# Patient Record
Sex: FEMALE | Race: WHITE | NOT HISPANIC OR LATINO | Employment: FULL TIME
[De-identification: names, ages, dates, MRNs, and addresses within clinical notes are randomized per-mention and may not be internally consistent; named-entity substitution may affect disease eponyms.]

---

## 2017-11-21 ENCOUNTER — RECORDS - HEALTHEAST (OUTPATIENT)
Dept: ADMINISTRATIVE | Facility: OTHER | Age: 47
End: 2017-11-21

## 2017-11-24 ENCOUNTER — RECORDS - HEALTHEAST (OUTPATIENT)
Dept: ADMINISTRATIVE | Facility: OTHER | Age: 47
End: 2017-11-24

## 2017-12-05 ENCOUNTER — RECORDS - HEALTHEAST (OUTPATIENT)
Dept: ADMINISTRATIVE | Facility: OTHER | Age: 47
End: 2017-12-05

## 2017-12-06 ENCOUNTER — RECORDS - HEALTHEAST (OUTPATIENT)
Dept: ADMINISTRATIVE | Facility: OTHER | Age: 47
End: 2017-12-06

## 2017-12-07 ENCOUNTER — RECORDS - HEALTHEAST (OUTPATIENT)
Dept: ADMINISTRATIVE | Facility: OTHER | Age: 47
End: 2017-12-07

## 2017-12-12 ENCOUNTER — RECORDS - HEALTHEAST (OUTPATIENT)
Dept: ADMINISTRATIVE | Facility: OTHER | Age: 47
End: 2017-12-12

## 2017-12-14 ENCOUNTER — RECORDS - HEALTHEAST (OUTPATIENT)
Dept: ADMINISTRATIVE | Facility: OTHER | Age: 47
End: 2017-12-14

## 2017-12-22 ENCOUNTER — OFFICE VISIT - HEALTHEAST (OUTPATIENT)
Dept: RADIATION ONCOLOGY | Facility: HOSPITAL | Age: 47
End: 2017-12-22

## 2017-12-22 ENCOUNTER — HOSPITAL ENCOUNTER (OUTPATIENT)
Dept: MRI IMAGING | Facility: CLINIC | Age: 47
Setting detail: RADIATION/ONCOLOGY SERIES
Discharge: STILL ADMITTED OR ADMINISTRATIVE | End: 2017-12-22
Attending: RADIOLOGY

## 2017-12-22 ENCOUNTER — AMBULATORY - HEALTHEAST (OUTPATIENT)
Dept: RADIATION ONCOLOGY | Facility: HOSPITAL | Age: 47
End: 2017-12-22

## 2017-12-22 DIAGNOSIS — C79.31 BRAIN METASTASES: ICD-10-CM

## 2017-12-26 ENCOUNTER — COMMUNICATION - HEALTHEAST (OUTPATIENT)
Dept: ONCOLOGY | Facility: HOSPITAL | Age: 47
End: 2017-12-26

## 2018-01-01 ENCOUNTER — OFFICE VISIT - HEALTHEAST (OUTPATIENT)
Dept: RADIATION ONCOLOGY | Age: 48
End: 2018-01-01

## 2018-01-01 ENCOUNTER — HOSPITAL ENCOUNTER (OUTPATIENT)
Dept: MRI IMAGING | Facility: CLINIC | Age: 48
Setting detail: RADIATION/ONCOLOGY SERIES
Discharge: STILL ADMITTED OR ADMINISTRATIVE | End: 2018-12-06
Attending: RADIOLOGY

## 2018-01-01 ENCOUNTER — AMBULATORY - HEALTHEAST (OUTPATIENT)
Dept: RADIATION ONCOLOGY | Facility: HOSPITAL | Age: 48
End: 2018-01-01

## 2018-01-01 ENCOUNTER — HOSPITAL ENCOUNTER (OUTPATIENT)
Dept: MRI IMAGING | Facility: CLINIC | Age: 48
Setting detail: RADIATION/ONCOLOGY SERIES
Discharge: STILL ADMITTED OR ADMINISTRATIVE | End: 2018-08-29
Attending: RADIOLOGY

## 2018-01-01 ENCOUNTER — AMBULATORY - HEALTHEAST (OUTPATIENT)
Dept: RADIATION ONCOLOGY | Age: 48
End: 2018-01-01

## 2018-01-01 ENCOUNTER — OFFICE VISIT - HEALTHEAST (OUTPATIENT)
Dept: RADIATION ONCOLOGY | Facility: HOSPITAL | Age: 48
End: 2018-01-01

## 2018-01-01 ENCOUNTER — ONCOLOGY VISIT (OUTPATIENT)
Dept: ONCOLOGY | Facility: CLINIC | Age: 48
End: 2018-01-01
Attending: INTERNAL MEDICINE
Payer: COMMERCIAL

## 2018-01-01 ENCOUNTER — COMMUNICATION - HEALTHEAST (OUTPATIENT)
Dept: RADIATION ONCOLOGY | Facility: CLINIC | Age: 48
End: 2018-01-01

## 2018-01-01 ENCOUNTER — AMBULATORY - HEALTHEAST (OUTPATIENT)
Dept: RADIATION ONCOLOGY | Facility: CLINIC | Age: 48
End: 2018-01-01

## 2018-01-01 ENCOUNTER — PRE VISIT (OUTPATIENT)
Dept: ONCOLOGY | Facility: CLINIC | Age: 48
End: 2018-01-01

## 2018-01-01 VITALS — HEIGHT: 64 IN | WEIGHT: 115 LBS | BODY MASS INDEX: 19.63 KG/M2

## 2018-01-01 DIAGNOSIS — C50.812 MALIGNANT NEOPLASM OF OVERLAPPING SITES OF LEFT BREAST IN FEMALE, ESTROGEN RECEPTOR POSITIVE (H): ICD-10-CM

## 2018-01-01 DIAGNOSIS — C79.31 BRAIN METASTASES: ICD-10-CM

## 2018-01-01 DIAGNOSIS — Z17.0 MALIGNANT NEOPLASM OF OVERLAPPING SITES OF LEFT BREAST IN FEMALE, ESTROGEN RECEPTOR POSITIVE (H): ICD-10-CM

## 2018-01-01 DIAGNOSIS — C79.31 BRAIN METASTASIS: ICD-10-CM

## 2018-01-01 DIAGNOSIS — C50.912 CARCINOMA OF LEFT BREAST METASTATIC TO MULTIPLE SITES (H): Primary | ICD-10-CM

## 2018-01-01 LAB
COPATH REPORT: NORMAL
CREAT BLD-MCNC: 0.8 MG/DL
POC GFR AMER AF HE - HISTORICAL: >60 ML/MIN/1.73M2
POC GFR NON AMER AF HE - HISTORICAL: >60 ML/MIN/1.73M2

## 2018-01-01 PROCEDURE — 00000346 ZZHCL STATISTIC REVIEW OUTSIDE SLIDES TC 88321: Performed by: INTERNAL MEDICINE

## 2018-01-01 PROCEDURE — G0463 HOSPITAL OUTPT CLINIC VISIT: HCPCS | Mod: ZF

## 2018-01-01 PROCEDURE — 99205 OFFICE O/P NEW HI 60 MIN: CPT | Mod: GC | Performed by: INTERNAL MEDICINE

## 2018-01-01 RX ORDER — ONDANSETRON 8 MG/1
TABLET, FILM COATED ORAL
COMMUNITY
Start: 2018-01-01

## 2018-01-01 RX ORDER — SPIRONOLACTONE 25 MG/1
25 TABLET ORAL EVERY MORNING
Refills: 3 | COMMUNITY
Start: 2018-01-01

## 2018-01-01 RX ORDER — LORAZEPAM 1 MG/1
TABLET ORAL
Refills: 0 | COMMUNITY
Start: 2018-01-01

## 2018-01-01 RX ORDER — OXYCODONE AND ACETAMINOPHEN 5; 325 MG/1; MG/1
TABLET ORAL
COMMUNITY
Start: 2018-01-01

## 2018-01-01 RX ORDER — LIDOCAINE/PRILOCAINE 2.5 %-2.5%
CREAM (GRAM) TOPICAL
Refills: 3 | COMMUNITY
Start: 2018-01-15

## 2018-01-01 ASSESSMENT — PAIN SCALES - GENERAL: PAINLEVEL: NO PAIN (0)

## 2018-01-08 ENCOUNTER — AMBULATORY - HEALTHEAST (OUTPATIENT)
Dept: RADIATION ONCOLOGY | Age: 48
End: 2018-01-08

## 2018-01-10 ENCOUNTER — AMBULATORY - HEALTHEAST (OUTPATIENT)
Dept: RADIATION ONCOLOGY | Age: 48
End: 2018-01-10

## 2018-01-12 ENCOUNTER — OFFICE VISIT - HEALTHEAST (OUTPATIENT)
Dept: RADIATION ONCOLOGY | Age: 48
End: 2018-01-12

## 2018-01-12 ENCOUNTER — AMBULATORY - HEALTHEAST (OUTPATIENT)
Dept: RADIATION ONCOLOGY | Age: 48
End: 2018-01-12

## 2018-01-12 DIAGNOSIS — C79.31 BRAIN METASTASES: ICD-10-CM

## 2018-01-15 ENCOUNTER — COMMUNICATION - HEALTHEAST (OUTPATIENT)
Dept: RADIATION ONCOLOGY | Age: 48
End: 2018-01-15

## 2018-01-19 ENCOUNTER — COMMUNICATION - HEALTHEAST (OUTPATIENT)
Dept: RADIATION ONCOLOGY | Age: 48
End: 2018-01-19

## 2018-11-06 NOTE — TELEPHONE ENCOUNTER
Date of appointment:11/13/18    Diagnosis/reason for appointment:L breast cancer with mets to bone, brain and lung  Referring provider/facility:Self  Who called:    Recent Studies  Imaging:PACS  Pathology:[facility] and [facility]  Labs:  Previous chemo/radiation (if known):    Records requested from:[facility] for Pathology: [facility] Radiology and Patholgy  Records received from:    Additional information:records in  for [facility] and [facility] along with images in PACS

## 2018-11-08 PROBLEM — C50.812 MALIGNANT NEOPLASM OF OVERLAPPING SITES OF LEFT BREAST IN FEMALE, ESTROGEN RECEPTOR POSITIVE (H): Status: ACTIVE | Noted: 2018-01-01

## 2018-11-08 PROBLEM — Z17.0 MALIGNANT NEOPLASM OF OVERLAPPING SITES OF LEFT BREAST IN FEMALE, ESTROGEN RECEPTOR POSITIVE (H): Status: ACTIVE | Noted: 2018-01-01

## 2018-11-09 NOTE — PROGRESS NOTES
Oncology Consultation:  Date on this visit: 11/13/2018    Patience Crabtree is referred by Dr. Frye for an oncology consultation. She requires evaluation for metastatic breast cancer.    History Of Present Illness:  Ms. Crabtree is a 48 year old female with left breast cancer metastasized to lymph nodes, lungs, bones, skin, and brain. She self-palpated a mass a few months prior to diagnosis and ultimately presented in 11/21/2017 with a large fungating mass of the left chest.  Initial PET/CT showed left axillary and multiple osseous metastases, including a left femoral neck lesion at risk for pathologic fracture.  Brain MRI showed 4 enhancing lesions, the largest was a 2.1 cm right parietal lobe lesion. The others included a 7 mm left frontal lobe lesion, a 4 mm right frontal lobe lesion, and a 5 mm right frontal lobe lesion.  Mastectomy on 12/14/17 showed an 11.9 cm tumor and 7.2 cm matted axillary lymph nodes.  ER staining was weak in 11-50%; SC weak in 1-10%.  HER-2 was non-amplified by FISH. Post-operatively, she received 20 Gy palliative radiation to the left hip and Cyberknife radiation to the intracranial metastases (x 4 1/2018). She established care with Dr. Frye at [facility] in [place] and received treatment with taxotere and carboplatin. She was also started on zometa for bone metastases. Around 3-4/2018, she also received 30 Gy to the left axilla which may have been performed due to oozing/drainage from metastatic disease at this site. Chemotherapy was changed to doxil on 7/26/18 due to disease progression.  She underwent cyberknife to left frontal lobe and superior vermis metastases in 09/2018. Treatment was changed to CAF on 10/4/18.    Systemic Cancer Treatment History:  - 01/2018 - 07/2018  Taxotere and Carboplatin x 8  - 07/2018 - 10/2018  Doxil x 2  - 10/2018 - present  CAF x 2    Interval History  Ms. Crabtree presents with her  today. We reviewed the above history and she provided  "additional information including what she described as radiation to residual tumor in the left breast. Rad/onc notes available in care everywhere from provider who administered cyber knife commented on left axillary radiation therapy.    She reports that she has not been tolerating chemotherapy well with a decline in her energy/exertional tolerance and overall QOL since ~8/2018. This was also confirmed on review of care everywhere records where she complained to rad/onc that doxil was \"kicking [her] butt\" on 9/11/18. Following her 2nd cycle of CAF, she was hospitalized due to pancytopenia particularly with a hgb down to 4.8.    Last chemo was 10/18/2018.    Her other QOL limiting issue has been recurrent ascites for which she's needed approx once weekly paracenteses since ~mid-August. 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 She has noticed that the frequency of paracentesis needs may have decreased slightly from needing them every approx 5 days to perhaps going a little longer than a week.    Since taking a small break from chemo, she has noticed improvement in her exertional tolerance. She is also trying to exercise more starting with simple stretches and walking around the house and to the mail box. However, since summer, she has been more sedentary and lately endorses spending > 50% of the day in bed/resting. She notes interval improvement in the \"strength\" in her low back lately.    Of note, she wanted to relay that prior to diagnosis, she was very active and did P90X.    She also reports doing accupuncture for almost a year as well as making multiple dietary modifications including cutting out sugar. She also asked about utility of other CAM, such as vitamin C infusions.    ROS  +Left hip tight and wobbly  +Low back gets painful when ascites worsens - managed with ~2 percocet per day  +Numbness on chest  +Right tooth bothersome - Pain and sensitivity to heat, cold, pressure; has had 4 " fillings previously, is seeing dentist tomorrow; no fevers nor swelling noted in the area  +Minimal LUE discomfort when stretching  +Mild constipation with last chemo treatment, managed with prune juice  +decreased UOP due to decreased intake due to discomfort from ascites/abdominal distension  + weight stable of late ~112 lbs, chrissy of 98 lbs at mastectomy, pre-diagnosis baseline weight 130 lbs  + Minimal/resolved neuropathy on bottom of left foot   +lymphedema - sees a therapist in [place] for myofascial release therapy    The remainder of a complete 14 point ROS was reviewed and was otherwise negative.    Past Medical/Surgical History:  1.  Breast cancer as per HPI.  2.  Left mastectomy and axillary lymphadenectomy.  3. Trigger finger release   4.  [place] teeth removal    Allergies:  Allergies as of 11/13/2018 - Juanjo as Reviewed 11/13/2018   Allergen Reaction Noted     Gluten meal  12/22/2017     Lac bovis  12/22/2017     Lactose Other (See Comments) 12/12/2017     Current Medications:  Current Outpatient Prescriptions   Medication Sig Dispense Refill     Cholecalciferol (D-5000) 5000 units TABS Take 10,000 Units by mouth       lidocaine-prilocaine (EMLA) cream APPLY A QUARTER SIZE AMOUNT TO PORT SITE 30-60 MINUTES PRIOR TO ACCESS - DO NOT RUB IN, COVER WITH PLASTIC WRAP  3     LORazepam (ATIVAN) 1 MG tablet TAKE 1 TABLET BY MOUTH THREE TIMES DAILY AS NEEDED FOR ANXIETY  0     ondansetron (ZOFRAN) 8 MG tablet Take 1 tablet by mouth every 8 hours as needed for nausea. Use this after trying the Prochlorperazine if nausea isn't controlled.       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     Social History Main Topics     Smoking status: Former Smoker     Smokeless tobacco: Never Used     Alcohol use Not on file     Drug use: Not on file     Sexual activity: Not on file     Other Topics Concern     Not on file     Social History Narrative     No narrative on file     Physical Exam:  BP (P) 107/60 (BP Location: Right arm, Patient Position: Chair, Cuff Size: Adult Regular)  Pulse (P) 77  Temp (P) 96.8  F (36  C) (Oral)  Resp (P) 16  Ht 1.626 m (5' 4\")  Wt 52.2 kg (115 lb)  SpO2 (P) 98%  BMI 19.74 kg/m2  General:  Thin, pale, chronically ill appearing female in NAD.  HEENT:  Normocephalic.  Sclera anicteric.  MMM.  No lesions of the oropharynx.  Lymph:  2 cm palpable left axillary mass.  Chest:  CTA bilaterally.  No wheezes or crackles. R portacath  CV:  RRR.  Nl S1 and S2.  No m/r/g.  Back: no tenderness to palpation over spinous processes, iliac crests nor scapulae  Abd:  Soft/NT, mildly distended.  BSs normoactive.  No hepatosplenomegaly.  Ext:  No pitting edema of the bilateral lower extremities.   Musculo:  Strength 5/5 throughout.  Neuro:  Cranial nerves grossly intact.  Gait stable.  Psych:  Mood and affect appear normal.    Laboratory/Imaging Studies:  10/28/18 Labs:  Platelets low at 30K  WBC low at 1.3 (ANC = 0.7, ALC = 0.5)  Hemoglobin low at 7.5 g/dL    10/27/18 RUQ abdominal ultrasound:  - Lobulated " appearance of the liver consistent with cirrhosis/pseudocirrhosis.  - moderate ascites    10/26/18 Labs:  Creatinine 0.55 mg/dL  Calcium 7.5 mg/dL  Total protein 5.1 g/dL  Albumin low at 2.9 g/dL  AST and ALT are wnl  ALK P elevated at 280    9/17/18 PET/CT:  - New metabolic activity in the left lobe of the thyroid (SUV 3.4)  - Mass posterior laterally in the right lung apex, increased in size, now measuring 1.8 cm.  Additional smaller nodules with increased metabolic activity noted.  - Moderate left and small right pleural effusion  - Soft tissue nodule adjacent to musculature in the left axilla measuring 1.7 cm, increased in size from previous  - small lymph nodes in bilateral axillae  - soft tissue nodule in the subcutaneous tissues in the anterior lateral left chest wall.  Measures 2.4 cm in greatest dimension and is increased in size.  - Nodular liver with atrophy of the left hepatic lobe  - Large volume ascites throughout the abdomen and pelvis  - Diffuse bone metastases, mixed sclerotic and lytic.  Overall increased metabolic activity of these lesions.    12/14/17 Left breast mastectomy:  - Grade 3 invasive ductal carcinoma with focal squamous differentiation measuring 11.9 cm  - invasive carcinoma <0.1 mm from the deep margin  - DCIS  - vascular invasion  - ER weak in 11-50% of tumor cell nuclei  - AL weak in 1-10% of tumor cell nuclei  - HER2 non-amplified by FISH    Left ALND:  - Multiple matted lymph nodes measuring 7.2 cm, extranodal extension present.    ASSESSMENT/PLAN:  Ms. Crabtree is a previously healthy 48 year old female who was diagnosed 11/2017 with ER weakly positive, AL negative, HER2 nonamplified grade 3 ductal carcinoma of the left breast cancer metastatic to bones, lung, skin, and brain most recently treated with CAF who presents for a second opinion.    1. Metastatic left breast cancer  We discussed with Ms. Crabtree and her spouse the goals of treatment are to improve overall survival while  maintaining quality of life.  We reviewed that her treatment thus far has been a non-traditional approach for the management of her breast cancer. In the overall context of palliative therapy and focus on quality of life, we generally recommend serial treatment with single agent cytotoxic chemotherapy as opposed to combination therapy. Overall outcomes (e.g. survival) has not been shown to be improved with combination chemotherapy over sequential monotherapy in the context of metastatic breast cancer. Combination chemotherapy may slightly improve CORREA and/or PFS, but at the cost of significant toxicity, which unfortunately, Ms. Crabtree has endorsed a significant decline in QOL for ~3 months.    We reviewed her history and treatment course thus far. We also reviewed the significant of hormone receptors in breast cancer and that endocrine therapy would be an option given the ER positivity, despite it being weak.  We discussed that endocrine therapy often has longer response times and decreased side effects than cytotoxic chemotherapy in the treatment of metastatic estrogen receptor positive breast cancer.  In the context of her poor performance status and ER positive disease, we recommend a change in therapy to CDK4/6 inhibitor in combination with an aromatase inhibitor at this time. We reviewed the potential side effects of this regimen.  This recommendation is based on the assumption that she does not have new/active brain mets. If repeat brain MRI were to show new brain mets, then we would recommend treatment with Xeloda due to its ability to cross the blood brain barrier. We reviewed that she is eligible for a clinical trial at the [facility] studying NKTR-102 (pegylated etirinotecan) which has demonstrated improved overall survival in those with brain mets in a prior trial (BEACON) and is currently being studied in a phase 3 trial (ATTAIN).    Outside of treatment, we also discussed the importance of her goals of  care and QOL, and that forgoing cancer directed therapy and enrolling in hospice would be appropriate anytime she felt ready.    Recommendations  -would switch to denosumab for treatment of bone mets  -agree with restaging PET/CT  -would also obtain brain MRI q3 months, due for repeat brain MRI at this time  -if she has new brain mets, would treat with xeloda (in addition to local therapy as indicated)  -if she does not have new brain mets, then would treat with palbociclib + letrozole per patient preference to have a break from traditional chemotherapy  -if she were to progress on xeloda, particularly in the context of brain mets, could consider enrolling in [facility] clinical trial utilizing NKTR-102   -overall, when utilizing traditional chemotherapy, would recommend a sequential, single agent approach over combinatorial    2. 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 This would be unexpected in a previously healthy, active, nonobese young female. Hepatits serologies performed and were negative.    Recommendations  -agree with ongoing GI evaluation  -consider evaluation for conditions such as hemochromatosis with a screening ferritin.    3. Pancytopenia 2/2 chemotherapy  This is likely contributing at least in part to her fatigue and decline in PS. As noted above, would recommend sequential single agent chemotherapy over combination chemotherapy.    4. Heart health  She does not appear to have had an echocardiogram since prior to doxil therapy, and she has also since received adriamycin. Would recommend TTE at this time.    5.  Genetic risk for cancer:  Has not yet had genetic counseling/testing, will place a referral at this time due to young age at diagnosis and family history of malignancy.    6. Follow up  PRN.  Ms. Crabtree plans to resume care with Dr. Frye in [place].  We have given her our contact  information and asked that she contact us with any concerns.    The above recommendations were discussed with Ms. Crabtree and her , and written out for them to take home with them. All questions were answered to their satisfaction.    Patient discussed with Dr. Harper.    Vasquez Beach MD ([place]), PhD   Hem/onc fellow    Patient was seen and discussed with Dr. Beach.  I have edited the above note to reflect our joint assessment and plan.  A total of 50 minutes of our 60 minute face to face visit was spent in counseling.    Aide Harper MD

## 2018-11-13 NOTE — MR AVS SNAPSHOT
"              After Visit Summary   2018    Ms. Patience Crabtree    MRN: [id number]           Patient Information     Date Of Birth          1970        Visit Information        Provider Department      2018 11:15 AM Aide Harper MD [facility]        Today's Diagnoses     Carcinoma of left breast metastatic to multiple sites (H)    -  1    Malignant neoplasm of overlapping sites of left breast in female, estrogen receptor positive (H)           Follow-ups after your visit        Who to contact     If you have questions or need follow up information about today's clinic visit or your schedule please contact [facility] directly at [phone].  Normal or non-critical lab and imaging results will be communicated to you by MyChart, letter or phone within 4 business days after the clinic has received the results. If you do not hear from us within 7 days, please contact the clinic through [employer] or phone. If you have a critical or abnormal lab result, we will notify you by phone as soon as possible.  Submit refill requests through [employer] or call your pharmacy and they will forward the refill request to us. Please allow 3 business days for your refill to be completed.          Additional Information About Your Visit        MyChart Information     [employer] lets you send messages to your doctor, view your test results, renew your prescriptions, schedule appointments and more. To sign up, go to [email or web address] . Click on \"Log in\" on the left side of the screen, which will take you to the Welcome page. Then click on \"Sign up Now\" on the right side of the page.     You will be asked to enter the access code listed below, as well as some personal information. Please follow the directions to create your username and password.     Your access code is: [email or web address]  Expires: 2019  6:31 AM     Your access code will  in 90 days. If you need " "help or a new code, please call your [facility] clinic or [phone].        Care EveryWhere ID     This is your Care EveryWhere ID. This could be used by other organizations to access your [facility] medical records  [id number]        Your Vitals Were     Height BMI (Body Mass Index)                1.626 m (5' 4\") 19.74 kg/m2           Blood Pressure from Last 3 Encounters:   11/13/18 (P) 107/60    Weight from Last 3 Encounters:   11/13/18 52.2 kg (115 lb)              Today, you had the following     No orders found for display      Information about OPIOIDS     PRESCRIPTION OPIOIDS: WHAT YOU NEED TO KNOW   We gave you an opioid (narcotic) pain medicine. It is important to manage your pain, but opioids are not always the best choice. You should first try all the other options your care team gave you. Take this medicine for as short a time (and as few doses) as possible.    Some activities can increase your pain, such as bandage changes or therapy sessions. It may help to take your pain medicine 30 to 60 minutes before these activities. Reduce your stress by getting enough sleep, working on hobbies you enjoy and practicing relaxation or meditation. Talk to your care team about ways to manage your pain beyond prescription opioids.    These medicines have risks:    DO NOT drive when on new or higher doses of pain medicine. These medicines can affect your alertness and reaction times, and you could be arrested for driving under the influence (DUI). If you need to use opioids long-term, talk to your care team about driving.    DO NOT operate heavy machinery    DO NOT do any other dangerous activities while taking these medicines.    DO NOT drink any alcohol while taking these medicines.     If the opioid prescribed includes acetaminophen, DO NOT take with any other medicines that contain acetaminophen. Read all labels carefully. Look for the word  acetaminophen  or  Tylenol.  Ask your pharmacist if you have questions " or are unsure.    You can get addicted to pain medicines, especially if you have a history of addiction (chemical, alcohol or substance dependence). Talk to your care team about ways to reduce this risk.    All opioids tend to cause constipation. Drink plenty of water and eat foods that have a lot of fiber, such as fruits, vegetables, prune juice, apple juice and high-fiber cereal. Take a laxative (Miralax, milk of magnesia, Colace, Senna) if you don t move your bowels at least every other day. Other side effects include upset stomach, sleepiness, dizziness, throwing up, tolerance (needing more of the medicine to have the same effect), physical dependence and slowed breathing.    Store your pills in a secure place, locked if possible. We will not replace any lost or stolen medicine. If you don t finish your medicine, please throw away (dispose) as directed by your pharmacist. The [place] Pollution [employer] has more information about safe disposal: [email or web address].[email or web address]-medications         Primary Care Provider    Tania Villegas RN       No address on file        Equal Access to Services     [facility]: Hadii janiya lino Sozeinab, waaxda luqadaha, qaybta kaalmabolivar godfrey, berenice villavicencio. So [facility] [phone].    ATENCIÓN: Si habla español, tiene a wade disposición servicios gratuitos de asistencia lingüística. Llame al [phone].    We comply with applicable federal civil rights laws and [place] laws. We do not discriminate on the basis of race, color, national origin, age, disability, sex, sexual orientation, or gender identity.            Thank you!     Thank you for choosing [facility] [place]  for your care. Our goal is always to provide you with excellent care. Hearing back from our patients is one way we can continue to improve our services. Please take a few minutes to complete the written survey that you may  receive in the mail after your visit with us. Thank you!             Your Updated Medication List - Protect others around you: Learn how to safely use, store and throw away your medicines at www.disposemymeds.org.          This list is accurate as of 11/13/18 11:59 PM.  Always use your most recent med list.                   Brand Name Dispense Instructions for use Diagnosis    D-5000 5000 units Tabs   Generic drug:  Cholecalciferol      Take 10,000 Units by mouth        lidocaine-prilocaine 2.5-2.5 % external cream    EMLA     APPLY A QUARTER SIZE AMOUNT TO PORT SITE 30-60 MINUTES PRIOR TO ACCESS - DO NOT RUB IN, COVER WITH PLASTIC WRAP        LORazepam 1 MG tablet    ATIVAN     TAKE 1 TABLET BY MOUTH THREE TIMES DAILY AS NEEDED FOR ANXIETY        ondansetron 8 MG tablet    ZOFRAN     Take 1 tablet by mouth every 8 hours as needed for nausea. Use this after trying the Prochlorperazine if nausea isn't controlled.        oxyCODONE-acetaminophen 5-325 MG tablet    PERCOCET     TAKE 1-2 TABLETS BY MOUTH EVERY 4 HOURS AS NEEDED FOR FOR SEVERE PAIN        spironolactone 25 MG tablet    ALDACTONE     Take 25 mg by mouth every morning

## 2018-11-13 NOTE — LETTER
11/13/2018       RE: Patience Crabtree  [address] [facility] [address]     Dear Colleague,    Thank you for referring your patient, Patience Crabtree, to the [facility]. Please see a copy of my visit note below.    Oncology Consultation:  Date on this visit: 11/13/2018    Patience Crabtree is referred by Dr. Frye for an oncology consultation. She requires evaluation for metastatic breast cancer.    History Of Present Illness:  Ms. Crabtree is a 48 year old female with left breast cancer metastasized to lymph nodes, lungs, bones, skin, and brain. She self-palpated a mass a few months prior to diagnosis and ultimately presented in 11/21/2017 with a large fungating mass of the left chest.  Initial PET/CT showed left axillary and multiple osseous metastases, including a left femoral neck lesion at risk for pathologic fracture.  Brain MRI showed 4 enhancing lesions, the largest was a 2.1 cm right parietal lobe lesion. The others included a 7 mm left frontal lobe lesion, a 4 mm right frontal lobe lesion, and a 5 mm right frontal lobe lesion.  Mastectomy on 12/14/17 showed an 11.9 cm tumor and 7.2 cm matted axillary lymph nodes.  ER staining was weak in 11-50%; RI weak in 1-10%.  HER-2 was non-amplified by FISH. Post-operatively, she received 20 Gy palliative radiation to the left hip and Cyberknife radiation to the intracranial metastases (x 4 1/2018). She established care with Dr. Frye at [facility] in [place] and received treatment with taxotere and carboplatin. She was also started on zometa for bone metastases. Around 3-4/2018, she also received 30 Gy to the left axilla which may have been performed due to oozing/drainage from metastatic disease at this site. Chemotherapy was changed to doxil on 7/26/18 due to disease progression.  She underwent cyberknife to left frontal lobe and superior vermis metastases in 09/2018. Treatment was changed to CAF on 10/4/18.    Systemic Cancer Treatment  "History:  - 01/2018 - 07/2018  Taxotere and Carboplatin x 8  - 07/2018 - 10/2018  Doxil x 2  - 10/2018 - present  CAF x 2    Interval History  Ms. Crabtree presents with her  today. We reviewed the above history and she provided additional information including what she described as radiation to residual tumor in the left breast. Rad/onc notes available in care everywhere from provider who administered cyber knife commented on left axillary radiation therapy.    She reports that she has not been tolerating chemotherapy well with a decline in her energy/exertional tolerance and overall QOL since ~8/2018. This was also confirmed on review of care everywhere records where she complained to rad/onc that doxil was \"kicking [her] butt\" on 9/11/18. Following her 2nd cycle of CAF, she was hospitalized due to pancytopenia particularly with a hgb down to 4.8.    Last chemo was 10/18/2018.    Her other QOL limiting issue has been recurrent ascites for which she's needed approx once weekly paracenteses since ~mid-August. 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 She has noticed that the frequency of paracentesis needs may have decreased slightly from needing them every approx 5 days to perhaps going a little longer than a week.    Since taking a small break from chemo, she has noticed improvement in her exertional tolerance. She is also trying to exercise more starting with simple stretches and walking around the house and to the mail box. However, since summer, she has been more sedentary and lately endorses spending > 50% of the day in bed/resting. She notes interval improvement in the \"strength\" in her low back lately.    Of note, she wanted to relay that prior to diagnosis, she was very active and did P90X.    She also reports doing accupuncture for almost a year as well as making multiple dietary modifications including cutting out sugar. She also asked about utility of other CAM, such as " vitamin C infusions.    ROS  +Left hip tight and wobbly  +Low back gets painful when ascites worsens - managed with ~2 percocet per day  +Numbness on chest  +Right tooth bothersome - Pain and sensitivity to heat, cold, pressure; has had 4 fillings previously, is seeing dentist tomorrow; no fevers nor swelling noted in the area  +Minimal LUE discomfort when stretching  +Mild constipation with last chemo treatment, managed with prune juice  +decreased UOP due to decreased intake due to discomfort from ascites/abdominal distension  + weight stable of late ~112 lbs, chrissy of 98 lbs at mastectomy, pre-diagnosis baseline weight 130 lbs  + Minimal/resolved neuropathy on bottom of left foot   +lymphedema - sees a therapist in [place] for myofascial release therapy    The remainder of a complete 14 point ROS was reviewed and was otherwise negative.    Past Medical/Surgical History:  1.  Breast cancer as per HPI.  2.  Left mastectomy and axillary lymphadenectomy.  3. Trigger finger release   4.  [place] teeth removal    Allergies:  Allergies as of 11/13/2018 - Juanjo as Reviewed 11/13/2018   Allergen Reaction Noted     Gluten meal  12/22/2017     Lac bovis  12/22/2017     Lactose Other (See Comments) 12/12/2017     Current Medications:  Current Outpatient Prescriptions   Medication Sig Dispense Refill     Cholecalciferol (D-5000) 5000 units TABS Take 10,000 Units by mouth       lidocaine-prilocaine (EMLA) cream APPLY A QUARTER SIZE AMOUNT TO PORT SITE 30-60 MINUTES PRIOR TO ACCESS - DO NOT RUB IN, COVER WITH PLASTIC WRAP  3     LORazepam (ATIVAN) 1 MG tablet TAKE 1 TABLET BY MOUTH THREE TIMES DAILY AS NEEDED FOR ANXIETY  0     ondansetron (ZOFRAN) 8 MG tablet Take 1 tablet by mouth every 8 hours as needed for nausea. Use this after trying the Prochlorperazine if nausea isn't controlled.       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  Has worked in [employer] since 2003; massage therapist prior to that    Tob: quit 2007, social smoker, maybe 1 pack per week 17 yo - 36 yo  ETOH: nothing in over a year  Rec: denies    Social History     Social History     Marital status:      Spouse name: N/A     Number of children: N/A     Years of education: N/A     Occupational History     Not on file.     Social History Main Topics     Smoking status: Former Smoker     Smokeless tobacco: Never Used     Alcohol use Not on file     Drug use: Not on file     Sexual activity: Not on file     Other Topics Concern     Not on file     Social History Narrative     No narrative on file     Physical Exam:  BP (P) 107/60 (BP Location: Right arm, Patient Position: Chair, Cuff Size: Adult Regular)  Pulse (P) 77  Temp (P) 96.8  F (36  C) (Oral)  Resp (P) 16  Ht 1.626 m (5' 4\")  Wt 52.2 kg (115 lb)  SpO2 (P) 98%  BMI 19.74 kg/m2  General:  Thin, pale, chronically ill appearing female in NAD.  HEENT:  Normocephalic.  Sclera anicteric.  MMM.  No lesions of the oropharynx.  Lymph:  2 cm palpable left axillary mass.  Chest:  CTA bilaterally.  No wheezes or crackles. R portacath  CV:  RRR.  Nl S1 and S2.  No m/r/g.  Back: no tenderness to palpation over spinous processes, iliac crests nor scapulae  Abd:  Soft/NT, mildly distended.  BSs normoactive.  No hepatosplenomegaly.  Ext:  No pitting edema of the bilateral lower extremities.   Musculo:  Strength 5/5 throughout.  Neuro:  Cranial nerves grossly intact.  Gait " stable.  Psych:  Mood and affect appear normal.    Laboratory/Imaging Studies:  10/28/18 Labs:  Platelets low at 30K  WBC low at 1.3 (ANC = 0.7, ALC = 0.5)  Hemoglobin low at 7.5 g/dL    10/27/18 RUQ abdominal ultrasound:  - Lobulated appearance of the liver consistent with cirrhosis/pseudocirrhosis.  - moderate ascites    10/26/18 Labs:  Creatinine 0.55 mg/dL  Calcium 7.5 mg/dL  Total protein 5.1 g/dL  Albumin low at 2.9 g/dL  AST and ALT are wnl  ALK P elevated at 280    9/17/18 PET/CT:  - New metabolic activity in the left lobe of the thyroid (SUV 3.4)  - Mass posterior laterally in the right lung apex, increased in size, now measuring 1.8 cm.  Additional smaller nodules with increased metabolic activity noted.  - Moderate left and small right pleural effusion  - Soft tissue nodule adjacent to musculature in the left axilla measuring 1.7 cm, increased in size from previous  - small lymph nodes in bilateral axillae  - soft tissue nodule in the subcutaneous tissues in the anterior lateral left chest wall.  Measures 2.4 cm in greatest dimension and is increased in size.  - Nodular liver with atrophy of the left hepatic lobe  - Large volume ascites throughout the abdomen and pelvis  - Diffuse bone metastases, mixed sclerotic and lytic.  Overall increased metabolic activity of these lesions.    12/14/17 Left breast mastectomy:  - Grade 3 invasive ductal carcinoma with focal squamous differentiation measuring 11.9 cm  - invasive carcinoma <0.1 mm from the deep margin  - DCIS  - vascular invasion  - ER weak in 11-50% of tumor cell nuclei  - ID weak in 1-10% of tumor cell nuclei  - HER2 non-amplified by FISH    Left ALND:  - Multiple matted lymph nodes measuring 7.2 cm, extranodal extension present.    ASSESSMENT/PLAN:  Ms. Crabtree is a previously healthy 48 year old female who was diagnosed 11/2017 with ER weakly positive, ID negative, HER2 nonamplified grade 3 ductal carcinoma of the left breast cancer metastatic to  bones, lung, skin, and brain most recently treated with CAF who presents for a second opinion.    1. Metastatic left breast cancer  We discussed with Ms. Crabtree and her spouse the goals of treatment are to improve overall survival while maintaining quality of life.  We reviewed that her treatment thus far has been a non-traditional approach for the management of her breast cancer. In the overall context of palliative therapy and focus on quality of life, we generally recommend serial treatment with single agent cytotoxic chemotherapy as opposed to combination therapy. Overall outcomes (e.g. survival) has not been shown to be improved with combination chemotherapy over sequential monotherapy in the context of metastatic breast cancer. Combination chemotherapy may slightly improve CORREA and/or PFS, but at the cost of significant toxicity, which unfortunately, Ms. Crabtree has endorsed a significant decline in QOL for ~3 months.    We reviewed her history and treatment course thus far. We also reviewed the significant of hormone receptors in breast cancer and that endocrine therapy would be an option given the ER positivity, despite it being weak.  We discussed that endocrine therapy often has longer response times and decreased side effects than cytotoxic chemotherapy in the treatment of metastatic estrogen receptor positive breast cancer.  In the context of her poor performance status and ER positive disease, we recommend a change in therapy to CDK4/6 inhibitor in combination with an aromatase inhibitor at this time. We reviewed the potential side effects of this regimen.  This recommendation is based on the assumption that she does not have new/active brain mets. If repeat brain MRI were to show new brain mets, then we would recommend treatment with Xeloda due to its ability to cross the blood brain barrier. We reviewed that she is eligible for a clinical trial at the [facility] studying NKTR-102 (pegylated  etirinotecan) which has demonstrated improved overall survival in those with brain mets in a prior trial (BEACON) and is currently being studied in a phase 3 trial (ATTAIN).    Outside of treatment, we also discussed the importance of her goals of care and QOL, and that forgoing cancer directed therapy and enrolling in hospice would be appropriate anytime she felt ready.    Recommendations  -would switch to denosumab for treatment of bone mets  -agree with restaging PET/CT  -would also obtain brain MRI q3 months, due for repeat brain MRI at this time  -if she has new brain mets, would treat with xeloda (in addition to local therapy as indicated)  -if she does not have new brain mets, then would treat with palbociclib + letrozole per patient preference to have a break from traditional chemotherapy  -if she were to progress on xeloda, particularly in the context of brain mets, could consider enrolling in ATTAIN clinical trial utilizing NKTR-102   -overall, when utilizing traditional chemotherapy, would recommend a sequential, single agent approach over combinatorial    2. 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 This would be unexpected in a previously healthy, active, nonobese young female. Hepatits serologies performed and were negative.    Recommendations  -agree with ongoing GI evaluation  -consider evaluation for conditions such as hemochromatosis with a screening ferritin.    3. Pancytopenia 2/2 chemotherapy  This is likely contributing at least in part to her fatigue and decline in PS. As noted above, would recommend sequential single agent chemotherapy over combination chemotherapy.    4. Heart health  She does not appear to have had an echocardiogram since prior to doxil therapy, and she has also since received adriamycin. Would recommend TTE at this time.    5.  Genetic risk for cancer:  Has not yet had  genetic counseling/testing, will place a referral at this time due to young age at diagnosis and family history of malignancy.    6. Follow up  PRN.  Ms. Crabtree plans to resume care with Dr. Frye in [place].  We have given her our contact information and asked that she contact us with any concerns.    The above recommendations were discussed with Ms. Crabtree and her , and written out for them to take home with them. All questions were answered to their satisfaction.    Patient discussed with Dr. Harper.    Vasquez Beach MD ([place]), PhD   Hem/onc fellow    Patient was seen and discussed with Dr. Beach.  I have edited the above note to reflect our joint assessment and plan.  A total of 50 minutes of our 60 minute face to face visit was spent in counseling.    Aide Harper MD

## 2018-11-13 NOTE — NURSING NOTE
Oncology Rooming Note    November 13, 2018 11:18 AM   Patience Crabtree is a 48 year old female who presents for:    Chief Complaint   Patient presents with     Oncology Clinic Visit     New - breast ca      Initial Vitals: Wt 52.2 kg (115 lb) There is no height or weight on file to calculate BMI. There is no height or weight on file to calculate BSA.  Data Unavailable Comment: Data Unavailable   No LMP recorded.  Allergies reviewed: Yes  Medications reviewed: Yes    Medications: Medication refills not needed today.  Pharmacy name entered into EPIC: Data Unavailable    Clinical concerns: has a tooth that may need to be pulled lower right jaw     6 minutes for nursing intake (face to face time)     Georgina Payne, CMA

## 2019-01-01 ENCOUNTER — COMMUNICATION - HEALTHEAST (OUTPATIENT)
Dept: ADMINISTRATIVE | Facility: HOSPITAL | Age: 49
End: 2019-01-01

## 2019-01-01 ENCOUNTER — COMMUNICATION - HEALTHEAST (OUTPATIENT)
Dept: RADIATION ONCOLOGY | Facility: HOSPITAL | Age: 49
End: 2019-01-01

## 2019-04-12 ENCOUNTER — COMMUNICATION - HEALTHEAST (OUTPATIENT)
Dept: ADMINISTRATIVE | Facility: HOSPITAL | Age: 49
End: 2019-04-12

## 2021-05-31 VITALS — WEIGHT: 104.8 LBS

## 2021-05-31 VITALS — WEIGHT: 100 LBS

## 2021-06-01 VITALS — WEIGHT: 112.2 LBS

## 2021-06-01 VITALS — WEIGHT: 118.5 LBS

## 2021-06-02 VITALS — BODY MASS INDEX: 18.4 KG/M2 | WEIGHT: 107.2 LBS

## 2021-06-14 NOTE — PROGRESS NOTES
[facility] Radiation Oncology Consult Note    Patient: Patience Crabtree  MRN: [id number]  Date of Service: 12/22/2017    Assessment / Impression     1. Brain metastases  MR Brain With Without Contrast     No matching staging information was found for the patient.  ECOG Peformance Status  ECOG Performance Status: 1  Distress Assessment Score: 3    Plan:   Schedule CT simulation and MRI for CyberKnife planning.  UPT prior since patient is pre-menopausal.    Ok to get palliative XRT to bony sites with Dr. Lagos as we plan XRT to brain.   Hold steroids at current dose. If sleep disturbance may decrease to BID (breakfast and early afternoon)  No seizures so no prophylaxis needed.    ER weakly positive, in event hormonal therapy used,  may start hormonal therapy but not Ibrance until after brain radiation complete. Cytotoxic chemotherapy on hold until 2 weeks post completion of SRS.        Face to face time  60 minutes with > 75% spent on consultation, education and coordination of care.  Intent of Therapy: Palliative  Side effects that may occur during or within weeks after Radiation Therapy      Fatigue and general weakness    Headache and scalp irritation    Loss of hair    Nausea, vomiting, and decrease in appetite    Darkening, irritation, itchiness, redness, dryness, peeling, thickening, scabbing, and ulceration of the scalp, neck and forehead    Side effects that may occur months or years after Radiation Therapy      Development of another tumor or cancer           Dizziness and balance problems    Decrease in hormone production    Brain inflammation or necrosis that may cause various neurologic symptoms    Seizures    Decrease in memory and thinking abilities    Decrease in hearing and feeling of ear congestion    Eye dryness and irritation    Loss of vision    Cataracts in the eyes    Poor healing after a trauma or surgery in the irradiated area    Facial numbness, pain and weakness    The risks, benefits and  alternatives to radiation therapy were outlined with the patient. All questions were answered and a consent was signed.        Subjective:      HPI: Patience Crabtree is a 47 y.o. female referred by Dr. Lagos for CyberKnife radiosurgery for 4 metastatic brain lesions.      Patience initially presented to urgent care 11/24/2017 with a large left breast mass.  Biopsy of the left breast and pathology was consistent with invasive ductal carcinoma, grade 3, with lymphovascular space invasion, weak estrogen receptor positivity, progesterone and HER-2 were negative.      1 week status post left modified radical mastectomy, and split thickness skin graft from left thigh to left chest. Pathology showed at 12cm high grade tumor with vascular invasion, insitu and invasive margins negative. ALN matted conglomerate of LN measuring  7.2cm     PET-CT from 12/6/2017 revealed significant uptake in the left breast, left axilla, multiple osseous metastatic lesions including a left femoral neck lesion with destructive disease concerning for risk of pathologic fracture.    MRI from 12/7/2017 showed 4 enhancing intracerebral lesions consistent with  metastatic disease.  The largest is in the right parietal lobe superiorly and measures 2.1 x 1.5 x 1.6 cm.  Three other lesions include a 7 mm lesion in the left frontal lobe posteriorly, a 4 mm lesion in the right frontal lobe subcortical white matter, and a 5 mm lesion in the right frontal lobe anteriorly within subcortical white matter.         Chief Complaint   Patient presents with     HE Cancer     Radiation     Prior Radiation: No  Concurrent Chemotherapy: No    Current Outpatient Prescriptions   Medication Sig Dispense Refill     dexamethasone (DECADRON) 4 MG tablet Take 4 mg by mouth 3 (three) times a day.       No current facility-administered medications for this visit.      Past Medical History:   Diagnosis Date     Breast cancer      Past Surgical History:   Procedure Laterality  Date     MASTECTOMY       Dairy and Gluten  No family history on file.  Social History     Social History     Marital status:      Spouse name: N/A     Number of children: N/A     Years of education: N/A     Occupational History     Not on file.     Social History Main Topics     Smoking status: Former Smoker     Smokeless tobacco: Not on file     Alcohol use No     Drug use: No     Sexual activity: Not on file     Other Topics Concern     Not on file     Social History Narrative     No narrative on file        Review of Systems:        General  General (WDL): Exceptions to WDL  Fatigue: Yes - Recent (Less than 3 months)  Generalized Muscle Weakness: Yes - Recent (Less than 3 months)  Weight Loss: Yes - Recent (Greater than 3 months) (20 lbs)  Night Sweats: Yes - Recent (Greater than 3 months)  EENT  ENT (WDL): All ENT elements are within defined limits  Respiratory       Respiratory (WDL): All respiratory elements are within defined limits  Cardiovascular  Cardiovascular (WDL): Exceptions to WDL  Palpitations: Yes - Recent (Less than 3 months)  Endocrine  Endocrine (WDL): All endocrine elements are within defined limits  Gastrointestinal  Gastrointestinal (WDL): All gastrointestinal elements are within defined limits  Musculoskeletal  Musculoskeletal (WDL): Exceptions to WDL  Range of Motion Limitation: Yes - Recent (Less than 3 months)  Activity Assistance: Yes - Recent (Less than 3 months)  Integumentary                  Neurological  Neurological (WDL): All neurological elements are within defined limits  Dominant Hand: Right  Psychological/Emotional   Psychological/Emotional (WDL): All psychological/emotional elements are within defined limits  Hematological/Lymphatic  Hematological/Lymphatic (WDL): All hematological/lymphatic elements are within defined limits  Dermatologic  Dermatologic (WDL): Exceptions to WDL  Open wounds: Yes - Recent (Less than 3 months)  Healing Incision: Yes - Recent (Less than 3  months)  Genitourinary/Reproductive  Genitourinary/Reproductive (WDL): Exceptions to WDL  Urination more than 2 times a night: Yes - Recent (Less than 3 months)  Reproductive  Age at start of periods: 12  Last menstural cycle: 12/03/2017  Number of pregnancies: 2  Age at first pregnancy: 17  Patient Pregnant?: No  Is the patient trying to get pregnant?: No  Is the patient on birth control: No  Pain              Currently in Pain: No/denies   AUA Assessment                    Accompanied by         Objective:     Physical Exam    Vitals:    12/22/17 0821   BP: 114/60   Pulse: 78   Temp: 98.3  F (36.8  C)   TempSrc: Oral   SpO2: 96%   Weight: 104 lb 12.8 oz (47.5 kg)       GENERAL: no acute distress. Cooperative in conversation.   HEENT: pupils are equal, round and reactive. Oromucosa is clean and intact. No ulcerations or mucositis noted. No bleeding noted.  RESP: lungs are clear bilaterally per auscultation. Regular respiratory rate. No wheezes or rhonchi.  CV: Regular, rate and rhythm.   CHEST: Left mastectomy site bandage dry and intact. .  ABD: soft, nontender.   MUSCULOSKELETAL: no palpable points of tenderness    PSYCH: within normal limits. No depression or anxiety.  SKIN: warm dry intact   LYMPH: no cervical, supraclavicular lymphadenopathy  EXTREMITIES: no edema   NEUROLOGIC: CNII-XII symmetric, normal strength, sensation and reflexes throughout, gait normal        Imaging: Outside scans from [facility] evaluated, 4 mets seen 3 on right and one on left.   Pathology:   DIAGNOSIS:  LEFT BREAST, MASTECTOMY       --HISTOLOGIC DIAGNOSIS: INVASIVE DUCTAL CARCINOMA WITH FOCALLY          PROMINENT SQUAMOUS DIFFERENTIATION       --HISTOLOGIC GRADE: GRADE 3 OF 3       --SIZE OF INVASIVE COMPONENT: 11.9 X 10.7 X 7.1 CM       --MARGINS:            INVASIVE CARCINOMA:  CARCINOMA LIES LESS THAN 0.1 CM FROM NEAREST            (DEEP) MARGIN            DUCTAL CARCINOMA IN SITU: DCIS LIES 0.2 CM FROM NEAREST  (DEEP)            MARGIN       --VASCULAR INVASION: PRESENT      LEFT AXILLARY CONTENTS, REGIONAL DISSECTION       --METASTATIC DUCTAL CARCINOMA, 7.2 CM MAXIMALLY, MOST CONSISTENT WITH          MULTIPLE MATTED LYMPH NODES  --EXTRANODAL EXTENSION IS PRESENT  Signed by: Elba Cardona MD MPH

## 2021-06-14 NOTE — PROGRESS NOTES
Pt ambulatory to radiation clinic for initial radiation consult, accompanied by family. VSS, denies pain. RN spent 20 minutes with pt discussing RT, RT planning, and RT side effects. CK pathway, Krames RT, Brain RT, and managing fatigue hand outs given with explanation. Pt and family educated on CK by [facility] and want to proceed. Further recommendations and orders per provider.

## 2021-06-15 NOTE — PROGRESS NOTES
Pt here for their final radiation treatment. DC instructions given verbally and in writing, pt verbalized their understanding. Pt feeling well, denies headache or other neurological changes. She's currently on decadron 4mg TID and I told her to make sure she follows up with Onc near home for taper, not to stop abruptly. She will not f/u here with us, but knows to make f/u apts near home with Med Onc.

## 2021-06-15 NOTE — PROGRESS NOTES
SBRT/SRS OTV Note      Assessment / Impression       1. Brain metastases       Tolerating radiation therapy well.  All questions and concerns addressed.    Plan:     Patient will be followed by his oncologist at [facility].  He will be followed with us as needed     Subjective:      HPI: Patience Crabtree is a 47 y.o. female with    1. Brain metastases         The following portions of the patient's history were reviewed and updated as appropriate: allergies, current medications, past family history, past medical history, past social history, past surgical history and problem list.    Assessment                  Body Site: Brain Site: brain mets  Stereotactic Radiosurgery: Yes  Stereotactic Radiosurgery date: 18  Concurrent Therapy: No  Today's Dose: 2100 & 2400  Total Dose for Brain: 2100 & 2400  Today's Fraction/Total Fraction Brain:  &   Ocular/Visual - other : 0: Normal  Middle ear/hearin: Normal  Tinnitus: 0: No  Depressed level of consciousness: 0: Normal  Oriented x of spheres: 3  Neuropathy - motor: 1: Subjective weakness but no objective findings  Ataxia: 0: Normal  Speech Impairment (e.g. Dysphasia or aphasia): 0: Normal  Seizures: 0: None                                            Sexuality Alteration                 Emotional Alteration Copin: Effective  Comfort Alteration KPS: 80% Can perform normal activity with effort, some signs of disease  Fatigue (ONS scale) : 5: Moderate Fatigue  Pain Location: denies   Nutrition Alteration Anorexia: 1: Loss of appetite  Nausea: 0: None  Vomitin: None  Dyspepsia and/or Heartburn: 0: None  Weight: 100 POUNDS  Skin Alteration Skin Sensation: 0: No problem  Skin Reaction: 0: None  Alopecia: 0: Normal  AUA Assessment                                  Accompanied by       Objective:     Exam:   Vitals:    18 1343   BP: 140/85   Pulse: 74   Temp: 98.5  F (36.9  C)    TempSrc: Oral   SpO2: 97%   Weight: 100 lb (45.4 kg)       Wt Readings from Last 8 Encounters:   01/12/18 100 lb (45.4 kg)   12/22/17 104 lb 12.8 oz (47.5 kg)       General: Alert and oriented, in no acute distress  Patience has no Erythema.  Aria chart and setup information reviewed    Kyleigh East MD

## 2021-06-19 NOTE — LETTER
Letter by Zoe Padilla at      Author: Zoe Padilla Service: -- Author Type: --    Filed:  Encounter Date: 4/12/2019 Status: (Other)         04/12/19    Patience Crabtree  [id number] [address] [address]    Dear Patience:  Thank you for allowing us to participate in your care.  Your health and progress is important to us.  Follow-up exams or studies are an integral part of your radiation therapy treatment.  The scheduling department has made several attempts to schedule your follow-up exams.  Our attempts to schedule this appointment have been unsuccessful.    Please contact us at [phone] to schedule your follow-up exam or to let us know how we may assist you.    Sincerely,    The Radiation Oncology Care Team    CC: [facility], 04/12/19, 10:21 AM

## 2021-06-20 NOTE — PROGRESS NOTES
Pt here ambulatory with her spouse for f/u, reconsult and simulation with Dr. Cardona for 1-3 brain metastases. She is from [facility]. [facility]. She's recently had a few paracentesis with large amounts of fluid removed. She doesn't have much for headaches, just some occasional pain posteriorly. Her last chemo was two weeks ago, next chemo scheduled for 9/13/2018. She states she hasn't had a menstrual cycle in about a year. Vision and hearing are okay. C/O low back pain for which she takes Percocet. We reviewed the SRS process as pt had CyberKnife with us back in January.

## 2021-06-20 NOTE — PROGRESS NOTES
"                                                     SBRT/SRS OTV Note      Assessment / Impression       1. Brain metastases (H)          Tolerating radiation therapy well.  All questions and concerns addressed.    Plan:   1)Abdomen getting distended and uncomfortable, has paracentesis scheduled for Wednesday but I encouraged her to move it up if she gets more uncomfortable.   2) She feels that Doxil is \"kicking her butt\" and wants to discuss with medical oncology holding the dose while the fatigue of from radiation kicks in.    3 f/u with Dr Lagos   4) No steroids used    Continue radiation treatment as prescribed. Left frontal lobe and superior vermis.   Radiation: Site: brain mets  Stereotactic Radiosurgery: Yes  Stereotactic Radiosurgery date: 09/10/18  Concurrent Therapy: No  Today's Dose: 2400 & 2400  Total Dose for Brain: 2400 & 2400  Today's Fraction/Total Fraction Brain:  &      Prior lesions  Right parietal, 2 right frontal, posterior left frontal done 2018    Subjective:      HPI: Patience Crabtree is a 47 y.o. female with    1. Brain metastases (H)         The following portions of the patient's history were reviewed and updated as appropriate: allergies, current medications, past family history, past medical history, past social history, past surgical history and problem list.    Assessment                  Body Site: Brain Site: brain mets  Stereotactic Radiosurgery: Yes  Stereotactic Radiosurgery date: 09/10/18  Concurrent Therapy: No  Today's Dose: 2400 & 2400  Total Dose for Brain: 2400 & 2400  Today's Fraction/Total Fraction Brain:  &   Ocular/Visual - other : 0: Normal  Middle ear/hearin: Normal  Tinnitus: 0: No  Depressed level of consciousness: 0: Normal  Oriented x of spheres: 3  Neuropathy - motor: 1: Subjective weakness but no objective findings  Ataxia: 0: Normal  Speech Impairment (e.g. Dysphasia or aphasia): 0: Normal  Seizures: 0: None                           "                  Sexuality Alteration                 Emotional Alteration Copin: Effective  Comfort Alteration KPS: 80% Can perform normal activity with effort, some signs of disease  Fatigue (ONS scale) : 4: Moderate Fatigue  Pain Location: denies   Nutrition Alteration Anorexia: 0: None  Nausea: 0: None  Vomitin: None  Dyspepsia and/or Heartburn: 0: None  Weight: 118.5 POUNDS  Skin Alteration Skin Sensation: 0: No problem  Skin Reaction: 0: None  Alopecia: 1: Mild hair loss  AUA Assessment                                  Accompanied by       Objective:     Exam:   Vitals:    09/10/18 0800   BP: 126/68   Pulse: 83   Temp: 98.5  F (36.9  C)   TempSrc: Oral   SpO2: 96%   Weight: 118 lb 8 oz (53.8 kg)       Wt Readings from Last 8 Encounters:   09/10/18 118 lb 8 oz (53.8 kg)   18 112 lb 3.2 oz (50.9 kg)   18 100 lb (45.4 kg)   17 104 lb 12.8 oz (47.5 kg)       General: Alert and oriented, in no acute distress  Patience has no Erythema.    Treatment Summary to Date    Aria chart and setup information reviewed    Elba Cardona MD

## 2021-06-20 NOTE — PROGRESS NOTES
Patient here ambulatory for final SRS treatment for brain mets.  Patient not currently taking steroids and denies any headaches.  Written discharge instructions reviewed and given to patient and family.  Follow up with doctors closer to home.  Call back number given to patient and family should they have any questions or concerns.

## 2021-06-20 NOTE — PROGRESS NOTES
Date of service:   9/10/2018    Radiation oncology procedure    Preoperative diagnosis: Brain metastasis    Postoperative diagnosis: same    Procedure:  Stereotactic CyberKnife therapy.  Simple cranial lesion x 2.      Surgeon:  Jenise Sun M.D., F [facility], FAANS    Indications:  Patience Crabtree was seen in consultation in the CyberKnife center.  She suffers from brain metastasis ×2.  Complete evaluation was done.  I feltShe was an excellent candidate for additional CyberKnife radio surgery.  I explained the risks and benefits of this procedure.  The patient wishes to proceed ahead.    Findings: I personally contoured and reviewed the fusion between MRI and CT.  I felt the MRI and CT fusion was excellent.  After contouring, I reviewed the physics plan for delivery of radiation.  The patient was to be treated with a total dose of 2400 for each lesion in 1 treatment session.    Description: Patience Crabtree came to the CyberKnife center.  She was seen pre-treatment and verification of consent occurred.  The plan was loaded on the console.  Orthogonal x-rays were digitally reconstructed from the images taken during CT sim.  Patient position was verified as accurate after table position shifts occurred.  Treatment commenced and continuous monitoring of table position occurred with corrections as needed.  The patient tolerated the CyberKnife therapy well and left the CyberKnife center in good condition.

## 2021-06-20 NOTE — PROGRESS NOTES
[facility] Radiation Oncology Follow Up Note    Patient: Patience Crabtree  MRN: [id number]  Date of Service: 08/29/2018    Assessment / Impression     1. Brain metastases (H)        No matching staging information was found for the patient.  ECOG Peformance Status  ECOG Performance Status: 2  Distress Assessment Score  Distress Assessment Score: 3  Interventions  Distress Assessment Intervention: Declined  Body site: Brain    Plan:   Upon further review of imaging, I agree that the right parasaggital lesion is likely treatment effect. Plan is to treat the single left frontal lesion. Formal radiology read not available on most recent MRI, may need to treat additional right frontal area per radiology which can likely be done at same time. She will not require steroids during her treatment.     We discussed risks and benefits, in detail, of radiation therapy including side effects as described below. The patient voices understanding of the information discussed and wishes to proceed forward with treatment. We will obtain CT sim to begin radiation planning. All questions and concerns addressed.     She has sx suggestive of TMJ and admits to grinding so recommended seeing her dentist for a splint.     Face to face time  60 minutes with > 75% spent on consultation, education and coordination of care.    Intent of Therapy: Palliative  Side effects that may occur during or within weeks after Radiation Therapy      Fatigue and general weakness    Headache and scalp irritation    Loss of hair    Nausea, vomiting, and decrease in appetite    Darkening, irritation, itchiness, redness, dryness, peeling, thickening, scabbing, and ulceration of the scalp, neck and forehead    Side effects that may occur months or years after Radiation Therapy      Development of another tumor or cancer           Dizziness and balance problems    Decrease in hormone production    Brain inflammation or necrosis that may cause various neurologic  symptoms    Seizures    Decrease in memory and thinking abilities    Decrease in hearing and feeling of ear congestion    Eye dryness and irritation    Loss of vision    Cataracts in the eyes    Poor healing after a trauma or surgery in the irradiated area    Facial numbness, pain and weakness    The risks, benefits and alternatives to radiation therapy were outlined with the patient. All questions were answered and a consent was signed.    Subjective:      HPI: Patience Crabtree is a 47 y.o. female who was treated here with CyberKnife radiosurgery for 4 metastatic brain lesions who has a new left subcentimeter frontal met.      Patience initially presented to urgent care 11/24/2017 with a large left breast mass.  Biopsy of the left breast and pathology was consistent with invasive ductal carcinoma, grade 3, with lymphovascular space invasion, weak estrogen receptor positivity, progesterone and HER-2 were negative.       1 week status post left modified radical mastectomy, and split thickness skin graft from left thigh to left chest. Pathology showed at 12cm high grade tumor with vascular invasion, insitu and invasive margins negative. ALN matted conglomerate of LN measuring  7.2cm      PET-CT from 12/6/2017 revealed significant uptake in the left breast, left axilla, multiple osseous metastatic lesions including a left femoral neck lesion with destructive disease concerning for risk of pathologic fracture.     MRI from 12/7/2017 showed 4 enhancing intracerebral lesions consistent with  metastatic disease.  The largest is in the right parietal lobe superiorly and measures 2.1 x 1.5 x 1.6 cm.  Three other lesions include a 7 mm lesion in the left frontal lobe posteriorly, a 4 mm lesion in the right frontal lobe subcortical white matter, and a 5 mm lesion in the right frontal lobe anteriorly within subcortical white matter.       She received radiotherapy to the left hip, 20 Gy in 5 fractions.    SITE TREATED: Right  frontal, left gyrus, right parietal frontal, right anterior frontal  TOTAL DOSE: 2400 cGy each  NUMBER OF FRACTIONS: 1  DATES COMPLETED:Left gyrus + rt frontal 1/10/2018, rt parietal frontal + rt anterior frontal  1/12/2018  CONCURRENT CHEMOTHERAPY: No  ADJUVANT THERAPY:Yes    She tolerated the treatment without unexpected side effects.     She started Carboplatin + Docetaxel with Neulasta on 1/22/2018 and finished 8th cycle on 6/28/2018. During that time the patient received 30 Gy to left axilla which was finished on 4/2/2018. Patient started receiving Doxil July 2018, and received her second dose on 8/16/2018.  Most recently,  MR on 7/18/2018 showing concerns for new enhancing lesion in the right posterior frontal lobe in a site previously treated. 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     The patient presents today to discuss CK radiation therapy to her new metastasis. She denies any neuro symptoms and has been tolerating chemotherapy although having some abdominal symptoms. Abdominal discomfort helped with compazine. Paracentesis on 8/14/18 (2.3L) and 8/27/18 (2.9L) which improved her abdominal distention and back discomfort. No cytology done. No focal neuro sx. No seizures. No diplopia. Does mention some discomfort over right TMJ.        Current Outpatient Prescriptions   Medication Sig Dispense Refill     LORazepam (ATIVAN) 1 MG tablet Take 1 tablet by mouth 3 (three) times a day as needed.       oxyCODONE-acetaminophen (PERCOCET) 5-325 mg per tablet Take 1-2 tablets by mouth every 4 (four) hours as needed.       cholecalciferol, vitamin D3, 5,000 unit Tab Take 5,000 Units by mouth.       ondansetron (ZOFRAN) 8 MG tablet Take 8 mg by mouth every 8 (eight) hours as needed.        prochlorperazine (COMPAZINE) 10 MG tablet Take 1 tablet by mouth every 6 (six) hours as needed.       No current facility-administered medications for this visit.        The following portions of the patient's history were reviewed and updated as appropriate: allergies, current medications, past family history, past medical history, past social history, past surgical history and problem list.    Review of Systems    General  Constitutional (WDL): Exceptions to WDL  Fatigue: Fatigue not relieved by rest - Limiting instrumental ADL  Fever: 38.0 - 39.0 degrees C (100.4 - 102.2 degrees F) (with Zometa)  Chills: Mild sensation of cold, shivering, chattering of teeth  EENT  Eye Disorder (WDL): All eye disorder elements are within defined limits  Ear Disorder (WDL): All ear disorder elements are within defined limits  Respiratory       Respiratory (WDL): Exceptions to WDL  Dyspnea: Shortness of breath with moderate exertion (when she has acities she gets SOB)  Cardiovascular  Cardiovascular (WDL): All cardiovascular elements are within defined limits  Endocrine     Gastrointestinal  Gastrointestinal (WDL): Exceptions to WDL  Anorexia: Loss of appetite without alteration in eating habits  Nausea: Loss of appetite without alteration in eating habits  Dehydration: Increased oral fluids indicated, dry mucous membranes, diminished skin turgor  Musculoskeletal  Musculoskeletal and Connetive Tissue Disorders (WDL): Exceptions to WDL (right facial bone pain. )  Bone Pain: Moderate pain, limiting instrumental ADL (low back)  Integumentary               Integumentary (WDL): Exceptions to WDL  Alopecia: Hair loss of up to 50% of normal for that individual that is not obvious from a distance but only on close inspection, a different hair style may be required to cover the hair loss but it does not require a wig or hair piece to camouflage  Neurological  Neurosensory (WDL): Exceptions to WDL  Peripheral Sensory Neuropathy:  Asymptomatic, loss of deep tendon reflexes or paresthesia (bottom of L foot)  Psychological/Emotional   Patient Coping: Accepting  Hematological/Lymphatic  Lymph (WDL): All lymph disorder elements are within defined limits  Dermatologic     Genitourinary/Reproductive  Genitourinary (WDL): All genitourinary elements are within defined limits (less urine production than usual)  Reproductive     Pain              Currently in Pain: Yes  Pain Score (Initial OR Reassessment): 1  Pain Frequency: Intermittent  Location: low back  Pain Intervention(s): Home medication  Response to Interventions: 1/10 without Percocet today, but some days it's worse.   AUA Assessment                                  Accompanied by  Accompanied by: Family Member      Objective:     Physical Exam    Vitals:    08/29/18 1249   BP: 125/68   Pulse: 87   Temp: 98.2  F (36.8  C)   TempSrc: Oral   SpO2: 96%   Weight: 112 lb 3.2 oz (50.9 kg)        GENERAL: No acute distress. Cooperative in conversation.   HEENT: Pupils are equal, round and reactive. Oromucosa is clean and intact. No ulcerations or mucositis noted. No bleeding noted. Point tenderness over right tendon consistent with TMJ.  RESP: Normal respiratory rate.  CV: Regular, rate and rhythm.  ABD: Soft, nontender.  MUSCULOSKELETAL:Point tenderness grove where muscles mastication are.    PSYCH: Within normal limits. No depression or anxiety.  SKIN: Warm dry intact.   LYMPH: No cervical, supraclavicular lymphadenopathy.  EXTREMITIES: No edema .  NEUROLOGIC: CNII-XII symmetric, normal strength, sensation and reflexes throughout, gait normal       Recent Labs: No results found for this or any previous visit (from the past 168 hour(s)).    Imaging: Imaging results 30 days: Personally looked at initial images prior to radiologist formal read and I see new small anterior left frontal brain met. Await final read.   I, Elba Cardona MD personally performed the services described in this  documentation, as scribed by Hayden Leos in my presence, and it is both accurate and complete.    Signed by: Elba Cardona MD, MPH

## 2021-06-22 NOTE — PROGRESS NOTES
[facility] Radiation Oncology Follow Up Note    Patient: Patience Crabtree  MRN: [id number]  Date of Service: 12/11/2018    Assessment / Impression     1. Brain metastases (H)        Cancer Staging  No matching staging information was found for the patient.  ECOG Peformance Status  ECOG Performance Status: 2  Distress Assessment Score  Distress Assessment Score: 3  Interventions  Distress Assessment Intervention: Provider Notified  Body site: Brain    Plan:   1. Latest MR reviewed at tumor board, due to lack of perfusion no The couple new 1mm areas of focus will be watched at this point as they are too small to fully characterize.  Next MR head Patient wishes to f/u at our facitily.  MRI w/wo contrast in 3 months.  2. Follow up in 3 months for routine office visit and imaging results.  3. Contacted Mert Lagos to let him know there are small new cervical mets seen on this last scan , that need to be reviewed for stability and need for treatment.      Face to face time  25 minutes with > 75% spent on consultation, education and coordination of care.    Subjective:      HPI: Patience Crabtree is a 48 y.o. female who was treated with CK for anterior left frontal brain met, previously treated with CyberKnife radiosurgery for 4 metastatic brain lesions.       Patience initially presented to urgent care 11/24/2017 with a large left breast mass. Biopsy of the left breast and pathology was consistent with invasive ductal carcinoma, grade 3, with lymphovascular space invasion, weak estrogen receptor positivity, progesterone and HER-2 were negative.       1 week status post left modified radical mastectomy, and split thickness skin graft from left thigh to left chest. Pathology showed at 12cm high grade tumor with vascular invasion, insitu and invasive margins negative. ALN matted conglomerate of LN measuring  7.2cm      PET-CT from 12/6/2017 revealed significant uptake in the left breast, left axilla, multiple osseous  metastatic lesions including a left femoral neck lesion with destructive disease concerning for risk of pathologic fracture.     MRI from 12/7/2017 showed 4 enhancing intracerebral lesions consistent with  metastatic disease.  The largest is in the right parietal lobe superiorly and measures 2.1 x 1.5 x 1.6 cm.  Three other lesions include a 7 mm lesion in the left frontal lobe posteriorly, a 4 mm lesion in the right frontal lobe subcortical white matter, and a 5 mm lesion in the right frontal lobe anteriorly within subcortical white matter.        She received radiotherapy to the left hip, 20 Gy in 5 fractions.     SITE TREATED: Right frontal, left gyrus, right parietal frontal, right anterior frontal  TOTAL DOSE: 2400 cGy each  NUMBER OF FRACTIONS: 1  DATES COMPLETED:Left gyrus + rt frontal 1/10/2018, rt parietal frontal + rt anterior frontal  1/12/2018  CONCURRENT CHEMOTHERAPY: No  ADJUVANT THERAPY:Yes     She tolerated the treatment without unexpected side effects.      She started Carboplatin + Docetaxel with Neulasta on 1/22/2018 and finished 8th cycle on 6/28/2018. During that time the patient received 30 Gy to left axilla which was finished on 4/2/2018. Patient started receiving Doxil July 2018, and received her second dose on 8/16/2018.  Most recently,  MR on 7/18/2018 showing concerns for new enhancing lesion in the right posterior frontal lobe in a site previously treated. 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     SITE TREATED: anterior left frontal, mid cereb  TOTAL DOSE: 2400 each  NUMBER OF FRACTIONS: 1 each  DATES COMPLETED: 9/10/2018  CONCURRENT CHEMOTHERAPY: No  ADJUVANT THERAPY:Yes    She tolerated the treatment without unexpected side effects.     The patient presents today  for routine follow up. She is feeling well. Still receiving paracentesis and discomfort now more tolerable and fluids 3.5L every 5 days, down from about 4.5L. Patient hospitalized for pancytopenia after 2 cycles CAF, now electing for letrozole and Ibranze. MR head 12/6/2018 showing treatment response, new small 1mm punctuate lesions too small to fully characterize.     Current Outpatient Medications   Medication Sig Dispense Refill     lidocaine-prilocaine (EMLA) cream APPLY A QUARTER SIZE AMOUNT TO PORT SITE 30-60 MINUTES PRIOR TO ACCESS - DO NOT RUB IN, COVER WITH PLASTIC WRAP       palbociclib (IBRANCE) 125 mg cap capsule Take 125 mg by mouth.       cholecalciferol, vitamin D3, 5,000 unit Tab Take 5,000 Units by mouth.       letrozole (FEMARA) 2.5 mg tablet Take 2.5 mg by mouth daily.  3     LORazepam (ATIVAN) 1 MG tablet Take 1 tablet by mouth 3 (three) times a day as needed.       morphine (MS CONTIN) 15 MG 12 hr tablet Take 15 mg by mouth 2 (two) times a day.  0     ondansetron (ZOFRAN) 8 MG tablet Take 8 mg by mouth every 8 (eight) hours as needed.       oxyCODONE-acetaminophen (PERCOCET) 5-325 mg per tablet Take 1-2 tablets by mouth every 4 (four) hours as needed.       prochlorperazine (COMPAZINE) 10 MG tablet Take 1 tablet by mouth every 6 (six) hours as needed.       No current facility-administered medications for this visit.        The following portions of the patient's history were reviewed and updated as appropriate: allergies, current medications, past family history, past medical history, past social history, past surgical history and problem list.    Review of Systems    General  Constitutional (WDL): Exceptions to WDL  Fatigue: Fatigue relieved by rest  EENT  Eye Disorder (WDL): All eye disorder elements are within defined limits  Ear Disorder (WDL): All ear disorder elements are within defined limits  Respiratory       Respiratory (WDL): Within Defined Limits  Cardiovascular  Cardiovascular  (WDL): All cardiovascular elements are within defined limits  Endocrine     Gastrointestinal  Gastrointestinal (WDL): All gastrointestinal elements are within defined limits  Musculoskeletal  Musculoskeletal and Connetive Tissue Disorders (WDL): Exceptions to WDL  Bone Pain: Moderate pain, limiting instrumental ADL  Integumentary               Integumentary (WDL): All integumentary elements are within defined limits  Neurological  Neurosensory (WDL): Exceptions to WDL  Peripheral Sensory Neuropathy: Asymptomatic, loss of deep tendon reflexes or paresthesia(tiny bit in fingers)  Psychological/Emotional   Patient Coping: Accepting  Hematological/Lymphatic  Lymph (WDL): All lymph disorder elements are within defined limits  Dermatologic     Genitourinary/Reproductive  Genitourinary (WDL): All genitourinary elements are within defined limits  Reproductive     Pain              Currently in Pain: Yes  Pain Score (Initial OR Reassessment): 2  Pain Frequency: Constant/continuous  Location: low back  Pain Intervention(s): Medication (See MAR)  Response to Interventions: helpful  AUA Assessment                                  Accompanied by  Accompanied by: Family Member      Objective:     Physical Exam    Vitals:    12/11/18 1111   BP: 117/70   Pulse: 84   Temp: 97.7  F (36.5  C)   TempSrc: Oral   SpO2: 95%   Weight: 107 lb 3.2 oz (48.6 kg)        GENERAL: No acute distress. Cooperative in conversation.   HEENT: pupils are equal, round and reactive. Oromucosa moist.  RESP: Normal respiratory effort.   ABD: Soft, nontender.  MUSCULOSKELETAL: no palpable points of tenderness   NEURO: Non focal. Alert and oriented x3.   PSYCH: Within normal limits. No depression or anxiety.  SKIN: Warm dry intact.   EXTREMITIES: No edema.    Recent Labs:   Recent Results (from the past 168 hour(s))   POCT creatinine   Result Value Ref Range    POC Creatinine 0.8 mg/dL   POCT GFR   Result Value Ref Range    POC GFR AMER AF HE >60  >60  mL/min/1.73m2    POC GFR NON AMER AF >60  >60 mL/min/1.73m2       Imaging: Imaging results 30 days: Mr Brain With Without Contrast    Result Date: 12/7/2018  [facility] RADIOLOGY EXAM: MR BRAIN W WO CONTRAST LOCATION: [facility] DATE/TIME: 12/6/2018 12:21 PM INDICATION: Intracranial metastases. Left frontal metastatic lesion treat with SRS 9/2018. COMPARISON: 08/29/2018. 12/22/2017. CONTRAST: Gadavist 5.4. TECHNIQUE: Multiplanar multisequence head MRI without and with intravenous contrast including dynamic susceptibility contrast perfusion imaging. FINDINGS: INTRACRANIAL CONTENTS: Intracranial metastatic lesions as follows: 1.  Previously seen 2 mm enhancing lesion in the left cerebellar vermis is no longer seen. 2.  Previously seen 3 mm lesion in the left middle frontal gyrus is no longer seen. 3.  New focus of enhancement in the left peritrigonal white matter measures 3 mm (series 21, image 16). 4.  New punctate focus of juxtacortical enhancement in the subcortical white matter in the left frontal lobe measures 1 to 2 mm (series 19, image 130). 5.  New punctate focus of juxtacortical enhancement in the anterior left frontal lobe measures 1 mm (series 19, image 80), not seen on the most recent prior exam. 6.  Punctate focus of juxtacortical enhancement in the left superior parietal lobule measures 1 to 2 mm (series 21, image 20; series 19, image 128) was less conspicuous but present on the 08/29/2018 study and is new from the 12/22/2017 study. 7.  Small focus of enhancement in the inferior left frontal lobe measures 3 mm (series 22, image 17; series 19, image 124). The lesion was present as a punctate focus on the 08/29/2018 exam and is new from 12/22/2017. 8.  Small focus of subcortical enhancement in the right frontal lobe measures 2 mm (series 21, image 18; series 22, image 17) and is not significantly changed from the prior study given differences in slice selection. 9.  Small focus of enhancement in  the anterior right frontal lobe subcortical white matter is slightly increased in size from the prior study measuring 3 mm (series 19, image 82; series 21, image 15), previously 2 mm. Similarly, there is increased abnormal T2 FLAIR signal associated with the lesion. 10.  Enhancing lesion in the paramedian right frontoparietal junction is increased in size measuring 1.3 x 1.1 cm (series 21, image 20), previously 0.9 x 0.8 cm. Similarly, there is increase in associated surrounding vasogenic edema. Possible small amount of mildly increased cerebral blood volume at the medial aspect of the lesion, although most of the lesion does not exhibit hyperperfusion. The remaining lesions are too small to characterize on perfusion weighted imaging. No acute or subacute infarct. No acute hemorrhage. No abnormal extra-axial fluid collection. No hydrocephalus. SELLA: No significant abnormality accounting for technique. OSSEOUS STRUCTURES/SOFT TISSUES: At least three new enhancing lesions in the dens process and C2 vertebral body, concerning for osseous metastases. The largest lesion in the anterior inferior C2 vertebral body measures up to 7 mm (series 19, image 103). The major intracranial vascular flow voids are maintained. ORBITS: No significant abnormality accounting for technique. SINUSES/MASTOIDS: No significant paranasal sinus mucosal disease. No significant middle ear or mastoid effusion.     CONCLUSION: 1.  Mixed progression of disease. Mild lesions in the vermis and left middle frontal gyrus have resolved, there are at least small metastatic lesions that are new from 12/22/2017 and additional lesions, which are increasing in size. 2.  Increased size and surrounding vasogenic edema of the largest lesion in the paramedian right frontoparietal junction is predominantly non-hyperperfusion; however, there is equivocal hyperenhancement at the medial aspect of the lesion. The enlargement  and increased vasogenic edema may  represent any combination of tumor treatment related change and tumor. Recommend close attention on follow-up. 3.  New osseous metastases of the C2 vertebral body. 4.  No acute intracranial abnormality. Findings were discussed with Dr. East at 8:57 AM on 12/07/2018       I, Elba Cardona MD personally performed the services described in this documentation, as scribed by Hayden Leos in my presence, and it is both accurate and complete.    Signed by: Elba Cardona MD, MPH

## 2021-06-23 NOTE — TELEPHONE ENCOUNTER
I called Patience at this time for more info. She said around Guillermo she started having left foot drop and weakness. She said since then she had a spinal MRI in [place]. She does have a f/u scheduled with her [facility] today. She said she really was calling to keep us in the loop. I told her we appreciate that and really we want her to get her primary oncology care through [facility] near home and that they can always call us directly or re-refer if needed. She also has a f/u brain MRI and apt here with us tentatively in March. She was fine not hearing directly from Dr. Cardona today and mentioning all her concerns to [facility] today.     ----- Message from Bianca Asher sent at 1/15/2019  9:30 AM CST -----  Regarding: Pt called with question  Contact: [phone]  Dr. Brendan Morin called and left a message asking for a call back from you.  She stated that she is having some numbness in her legs.  She is a pt from [facility] that comes here for CK.     Please give her a call at [phone].    Thanks!  Bianca